# Patient Record
Sex: MALE | Race: ASIAN | NOT HISPANIC OR LATINO | URBAN - METROPOLITAN AREA
[De-identification: names, ages, dates, MRNs, and addresses within clinical notes are randomized per-mention and may not be internally consistent; named-entity substitution may affect disease eponyms.]

---

## 2017-06-08 ENCOUNTER — CHARTING TRANS (OUTPATIENT)
Dept: URGENT CARE | Age: 47
End: 2017-06-08

## 2017-06-08 ASSESSMENT — PAIN SCALES - GENERAL: PAINLEVEL_OUTOF10: 4

## 2017-06-15 ENCOUNTER — CHARTING TRANS (OUTPATIENT)
Dept: URGENT CARE | Age: 47
End: 2017-06-15

## 2017-06-15 ENCOUNTER — MYAURORA ACCOUNT LINK (OUTPATIENT)
Dept: OTHER | Age: 47
End: 2017-06-15

## 2017-09-13 ENCOUNTER — CHARTING TRANS (OUTPATIENT)
Dept: URGENT CARE | Age: 47
End: 2017-09-13

## 2017-10-18 ENCOUNTER — CHARTING TRANS (OUTPATIENT)
Dept: OTHER | Age: 47
End: 2017-10-18

## 2017-10-31 ENCOUNTER — CHARTING TRANS (OUTPATIENT)
Dept: URGENT CARE | Age: 47
End: 2017-10-31

## 2017-11-02 ENCOUNTER — CHARTING TRANS (OUTPATIENT)
Dept: URGENT CARE | Age: 47
End: 2017-11-02

## 2017-11-02 ENCOUNTER — MYAURORA ACCOUNT LINK (OUTPATIENT)
Dept: OTHER | Age: 47
End: 2017-11-02

## 2017-12-08 ENCOUNTER — CHARTING TRANS (OUTPATIENT)
Dept: OTHER | Age: 47
End: 2017-12-08

## 2017-12-08 ENCOUNTER — MYAURORA ACCOUNT LINK (OUTPATIENT)
Dept: OTHER | Age: 47
End: 2017-12-08

## 2017-12-08 ENCOUNTER — CHARTING TRANS (OUTPATIENT)
Dept: INTERNAL MEDICINE | Age: 47
End: 2017-12-08

## 2018-02-23 ENCOUNTER — CHARTING TRANS (OUTPATIENT)
Dept: OTHER | Age: 48
End: 2018-02-23

## 2018-02-23 ENCOUNTER — LAB SERVICES (OUTPATIENT)
Dept: OTHER | Age: 48
End: 2018-02-23

## 2018-02-23 LAB — DEPRECATED S PYO AG THROAT QL EIA: NEGATIVE

## 2018-04-29 ENCOUNTER — MYAURORA ACCOUNT LINK (OUTPATIENT)
Dept: OTHER | Age: 48
End: 2018-04-29

## 2018-04-29 ENCOUNTER — CHARTING TRANS (OUTPATIENT)
Dept: OTHER | Age: 48
End: 2018-04-29

## 2018-04-29 ASSESSMENT — PAIN SCALES - GENERAL: PAINLEVEL_OUTOF10: 5

## 2018-05-13 ENCOUNTER — MYAURORA ACCOUNT LINK (OUTPATIENT)
Dept: OTHER | Age: 48
End: 2018-05-13

## 2018-05-13 ENCOUNTER — CHARTING TRANS (OUTPATIENT)
Dept: OTHER | Age: 48
End: 2018-05-13

## 2018-05-13 ASSESSMENT — PAIN SCALES - GENERAL: PAINLEVEL_OUTOF10: 0

## 2018-05-15 ENCOUNTER — CHARTING TRANS (OUTPATIENT)
Dept: OTHER | Age: 48
End: 2018-05-15

## 2018-08-02 ENCOUNTER — CHARTING TRANS (OUTPATIENT)
Dept: OTHER | Age: 48
End: 2018-08-02

## 2018-08-02 ENCOUNTER — MYAURORA ACCOUNT LINK (OUTPATIENT)
Dept: OTHER | Age: 48
End: 2018-08-02

## 2018-10-12 ENCOUNTER — MYAURORA ACCOUNT LINK (OUTPATIENT)
Dept: OTHER | Age: 48
End: 2018-10-12

## 2018-10-12 ENCOUNTER — CHARTING TRANS (OUTPATIENT)
Dept: OTHER | Age: 48
End: 2018-10-12

## 2018-10-12 ASSESSMENT — PAIN SCALES - GENERAL: PAINLEVEL_OUTOF10: 0

## 2018-10-13 ENCOUNTER — MYAURORA ACCOUNT LINK (OUTPATIENT)
Dept: OTHER | Age: 48
End: 2018-10-13

## 2018-10-13 ENCOUNTER — CHARTING TRANS (OUTPATIENT)
Dept: OTHER | Age: 48
End: 2018-10-13

## 2018-10-13 ASSESSMENT — PAIN SCALES - GENERAL: PAINLEVEL_OUTOF10: 0

## 2018-11-27 VITALS — SYSTOLIC BLOOD PRESSURE: 108 MMHG | DIASTOLIC BLOOD PRESSURE: 56 MMHG | WEIGHT: 250 LBS | HEART RATE: 84 BPM

## 2018-11-27 VITALS
TEMPERATURE: 98.3 F | DIASTOLIC BLOOD PRESSURE: 82 MMHG | HEART RATE: 80 BPM | OXYGEN SATURATION: 97 % | RESPIRATION RATE: 16 BRPM | SYSTOLIC BLOOD PRESSURE: 142 MMHG

## 2018-11-27 VITALS
SYSTOLIC BLOOD PRESSURE: 136 MMHG | DIASTOLIC BLOOD PRESSURE: 86 MMHG | TEMPERATURE: 97.5 F | OXYGEN SATURATION: 96 % | HEART RATE: 86 BPM

## 2018-11-28 VITALS
SYSTOLIC BLOOD PRESSURE: 131 MMHG | OXYGEN SATURATION: 97 % | TEMPERATURE: 96.9 F | DIASTOLIC BLOOD PRESSURE: 55 MMHG | HEART RATE: 77 BPM | RESPIRATION RATE: 16 BRPM

## 2018-11-28 VITALS
OXYGEN SATURATION: 94 % | TEMPERATURE: 96.3 F | HEART RATE: 76 BPM | RESPIRATION RATE: 16 BRPM | SYSTOLIC BLOOD PRESSURE: 121 MMHG | DIASTOLIC BLOOD PRESSURE: 86 MMHG

## 2018-11-28 VITALS
HEART RATE: 70 BPM | OXYGEN SATURATION: 99 % | TEMPERATURE: 96.9 F | SYSTOLIC BLOOD PRESSURE: 138 MMHG | DIASTOLIC BLOOD PRESSURE: 86 MMHG | RESPIRATION RATE: 18 BRPM

## 2019-01-28 ENCOUNTER — WALK IN (OUTPATIENT)
Dept: URGENT CARE | Age: 49
End: 2019-01-28

## 2019-01-28 VITALS
OXYGEN SATURATION: 99 % | SYSTOLIC BLOOD PRESSURE: 132 MMHG | RESPIRATION RATE: 16 BRPM | HEART RATE: 79 BPM | DIASTOLIC BLOOD PRESSURE: 82 MMHG | TEMPERATURE: 97.4 F

## 2019-01-28 DIAGNOSIS — J06.9 URI, ACUTE: Primary | ICD-10-CM

## 2019-01-28 PROCEDURE — 99213 OFFICE O/P EST LOW 20 MIN: CPT | Performed by: FAMILY MEDICINE

## 2019-01-28 RX ORDER — LISINOPRIL 20 MG/1
20 TABLET ORAL
COMMUNITY
End: 2019-03-10 | Stop reason: ALTCHOICE

## 2019-01-28 RX ORDER — VALSARTAN 160 MG/1
1 TABLET ORAL
COMMUNITY
Start: 2016-02-22

## 2019-01-28 RX ORDER — DEXTROMETHORPHAN HYDROBROMIDE AND PROMETHAZINE HYDROCHLORIDE 15; 6.25 MG/5ML; MG/5ML
SYRUP ORAL
COMMUNITY
Start: 2018-10-12 | End: 2019-03-10 | Stop reason: ALTCHOICE

## 2019-01-28 RX ORDER — CETIRIZINE HYDROCHLORIDE 10 MG/1
10 TABLET ORAL
COMMUNITY
Start: 2015-05-15 | End: 2019-03-10 | Stop reason: CLARIF

## 2019-03-05 VITALS
RESPIRATION RATE: 20 BRPM | HEART RATE: 106 BPM | TEMPERATURE: 102.7 F | SYSTOLIC BLOOD PRESSURE: 160 MMHG | OXYGEN SATURATION: 95 % | DIASTOLIC BLOOD PRESSURE: 72 MMHG

## 2019-03-05 VITALS
OXYGEN SATURATION: 93 % | RESPIRATION RATE: 20 BRPM | SYSTOLIC BLOOD PRESSURE: 142 MMHG | HEART RATE: 112 BPM | DIASTOLIC BLOOD PRESSURE: 88 MMHG | TEMPERATURE: 102 F

## 2019-03-06 VITALS
DIASTOLIC BLOOD PRESSURE: 78 MMHG | RESPIRATION RATE: 16 BRPM | TEMPERATURE: 96.8 F | SYSTOLIC BLOOD PRESSURE: 124 MMHG | OXYGEN SATURATION: 98 % | HEART RATE: 66 BPM

## 2019-03-06 VITALS
HEART RATE: 60 BPM | TEMPERATURE: 97.4 F | DIASTOLIC BLOOD PRESSURE: 78 MMHG | OXYGEN SATURATION: 100 % | SYSTOLIC BLOOD PRESSURE: 122 MMHG | RESPIRATION RATE: 14 BRPM

## 2019-03-06 VITALS
OXYGEN SATURATION: 95 % | DIASTOLIC BLOOD PRESSURE: 82 MMHG | RESPIRATION RATE: 16 BRPM | TEMPERATURE: 98.1 F | SYSTOLIC BLOOD PRESSURE: 122 MMHG | HEART RATE: 110 BPM

## 2019-03-10 ENCOUNTER — WALK IN (OUTPATIENT)
Dept: URGENT CARE | Age: 49
End: 2019-03-10

## 2019-03-10 DIAGNOSIS — J06.9 URI, ACUTE: Primary | ICD-10-CM

## 2019-03-10 PROCEDURE — 99204 OFFICE O/P NEW MOD 45 MIN: CPT | Performed by: PEDIATRICS

## 2019-03-10 RX ORDER — ONDANSETRON 8 MG/1
8 TABLET, ORALLY DISINTEGRATING ORAL EVERY 8 HOURS PRN
Qty: 10 TABLET | Refills: 0 | Status: SHIPPED | OUTPATIENT
Start: 2019-03-10 | End: 2019-03-15

## 2019-03-10 RX ORDER — BENZONATATE 200 MG/1
200 CAPSULE ORAL 3 TIMES DAILY PRN
Qty: 30 CAPSULE | Refills: 0 | Status: SHIPPED | OUTPATIENT
Start: 2019-03-10 | End: 2019-03-20

## 2019-03-10 ASSESSMENT — PAIN SCALES - GENERAL: PAINLEVEL: 0

## 2019-05-14 ENCOUNTER — WALK IN (OUTPATIENT)
Dept: URGENT CARE | Age: 49
End: 2019-05-14

## 2019-05-14 VITALS
RESPIRATION RATE: 16 BRPM | OXYGEN SATURATION: 99 % | TEMPERATURE: 97.9 F | DIASTOLIC BLOOD PRESSURE: 90 MMHG | SYSTOLIC BLOOD PRESSURE: 140 MMHG | HEART RATE: 90 BPM

## 2019-05-14 DIAGNOSIS — J06.9 UPPER RESPIRATORY TRACT INFECTION, UNSPECIFIED TYPE: Primary | ICD-10-CM

## 2019-05-14 DIAGNOSIS — R05.9 COUGH: ICD-10-CM

## 2019-05-14 PROCEDURE — 99214 OFFICE O/P EST MOD 30 MIN: CPT | Performed by: FAMILY MEDICINE

## 2019-05-14 PROCEDURE — 3077F SYST BP >= 140 MM HG: CPT | Performed by: FAMILY MEDICINE

## 2019-05-14 RX ORDER — AZITHROMYCIN 250 MG/1
TABLET, FILM COATED ORAL
Qty: 6 TABLET | Refills: 0 | Status: SHIPPED | OUTPATIENT
Start: 2019-05-14 | End: 2019-05-19

## 2019-05-27 ENCOUNTER — WALK IN (OUTPATIENT)
Dept: URGENT CARE | Age: 49
End: 2019-05-27

## 2019-05-27 ENCOUNTER — IMAGING SERVICES (OUTPATIENT)
Dept: GENERAL RADIOLOGY | Age: 49
End: 2019-05-27
Attending: FAMILY MEDICINE

## 2019-05-27 DIAGNOSIS — S99.922A INJURY OF GREAT TOE OF LEFT FOOT, INITIAL ENCOUNTER: Primary | ICD-10-CM

## 2019-05-27 DIAGNOSIS — S99.922A INJURY OF GREAT TOE OF LEFT FOOT, INITIAL ENCOUNTER: ICD-10-CM

## 2019-05-27 PROCEDURE — 3074F SYST BP LT 130 MM HG: CPT | Performed by: FAMILY MEDICINE

## 2019-05-27 PROCEDURE — 99214 OFFICE O/P EST MOD 30 MIN: CPT | Performed by: FAMILY MEDICINE

## 2019-05-27 PROCEDURE — 3079F DIAST BP 80-89 MM HG: CPT | Performed by: FAMILY MEDICINE

## 2019-05-27 PROCEDURE — 73630 X-RAY EXAM OF FOOT: CPT | Performed by: RADIOLOGY

## 2019-05-27 ASSESSMENT — PAIN SCALES - GENERAL: PAINLEVEL: 5-6

## 2019-07-09 ENCOUNTER — HOSPITAL ENCOUNTER (OUTPATIENT)
Dept: MRI IMAGING | Facility: HOSPITAL | Age: 49
Discharge: HOME | End: 2019-07-09
Attending: ANESTHESIOLOGY
Payer: COMMERCIAL

## 2019-07-09 DIAGNOSIS — M25.572 LEFT ANKLE PAIN, UNSPECIFIED CHRONICITY: ICD-10-CM

## 2019-07-09 PROCEDURE — 73721 MRI JNT OF LWR EXTRE W/O DYE: CPT | Performed by: ANESTHESIOLOGY

## 2019-08-07 ENCOUNTER — WALK IN (OUTPATIENT)
Dept: URGENT CARE | Age: 49
End: 2019-08-07

## 2019-08-07 DIAGNOSIS — S90.112D CONTUSION OF LEFT GREAT TOE WITHOUT DAMAGE TO NAIL, SUBSEQUENT ENCOUNTER: Primary | ICD-10-CM

## 2019-08-07 PROCEDURE — 3077F SYST BP >= 140 MM HG: CPT | Performed by: FAMILY MEDICINE

## 2019-08-07 PROCEDURE — 3080F DIAST BP >= 90 MM HG: CPT | Performed by: FAMILY MEDICINE

## 2019-08-07 PROCEDURE — 99213 OFFICE O/P EST LOW 20 MIN: CPT | Performed by: FAMILY MEDICINE

## 2019-08-07 ASSESSMENT — PAIN SCALES - GENERAL: PAINLEVEL: 1-2

## 2019-08-31 ENCOUNTER — WALK IN (OUTPATIENT)
Dept: URGENT CARE | Age: 49
End: 2019-08-31

## 2019-08-31 DIAGNOSIS — R05.9 COUGH: Primary | ICD-10-CM

## 2019-08-31 PROCEDURE — 99214 OFFICE O/P EST MOD 30 MIN: CPT | Performed by: FAMILY MEDICINE

## 2019-08-31 PROCEDURE — 3079F DIAST BP 80-89 MM HG: CPT | Performed by: FAMILY MEDICINE

## 2019-08-31 PROCEDURE — 3077F SYST BP >= 140 MM HG: CPT | Performed by: FAMILY MEDICINE

## 2019-08-31 RX ORDER — AZITHROMYCIN 250 MG/1
TABLET, FILM COATED ORAL
Qty: 6 TABLET | Refills: 0 | Status: SHIPPED | OUTPATIENT
Start: 2019-08-31 | End: 2019-09-05

## 2019-08-31 RX ORDER — NAPROXEN 500 MG/1
500 TABLET ORAL 2 TIMES DAILY WITH MEALS
Qty: 30 TABLET | Refills: 0 | Status: SHIPPED | OUTPATIENT
Start: 2019-08-31 | End: 2020-03-12 | Stop reason: ALTCHOICE

## 2019-08-31 ASSESSMENT — PAIN SCALES - GENERAL: PAINLEVEL: 0

## 2019-10-24 ENCOUNTER — IMMUNIZATION (OUTPATIENT)
Dept: URGENT CARE | Age: 49
End: 2019-10-24

## 2019-10-24 DIAGNOSIS — Z23 INFLUENZA VACCINE ADMINISTERED: Primary | ICD-10-CM

## 2019-10-24 PROCEDURE — 90471 IMMUNIZATION ADMIN: CPT | Performed by: FAMILY MEDICINE

## 2019-10-24 PROCEDURE — 90686 IIV4 VACC NO PRSV 0.5 ML IM: CPT | Performed by: FAMILY MEDICINE

## 2019-11-26 ENCOUNTER — OFFICE VISIT (OUTPATIENT)
Dept: OTOLARYNGOLOGY | Age: 49
End: 2019-11-26

## 2019-11-26 VITALS — WEIGHT: 235 LBS

## 2019-11-26 DIAGNOSIS — H61.23 BILATERAL IMPACTED CERUMEN: Primary | ICD-10-CM

## 2019-11-26 PROCEDURE — 69210 REMOVE IMPACTED EAR WAX UNI: CPT | Performed by: PHYSICIAN ASSISTANT

## 2019-11-26 PROCEDURE — 99201 OFFICE/OUTPT VISIT,NEW,LEVL I: CPT | Performed by: PHYSICIAN ASSISTANT

## 2020-03-12 ENCOUNTER — WALK IN (OUTPATIENT)
Dept: URGENT CARE | Age: 50
End: 2020-03-12

## 2020-03-12 DIAGNOSIS — R04.0 EPISTAXIS: ICD-10-CM

## 2020-03-12 DIAGNOSIS — J06.9 ACUTE UPPER RESPIRATORY INFECTION, UNSPECIFIED: Primary | ICD-10-CM

## 2020-03-12 PROCEDURE — 99214 OFFICE O/P EST MOD 30 MIN: CPT | Performed by: FAMILY MEDICINE

## 2020-03-12 PROCEDURE — 3079F DIAST BP 80-89 MM HG: CPT | Performed by: FAMILY MEDICINE

## 2020-03-12 PROCEDURE — 3077F SYST BP >= 140 MM HG: CPT | Performed by: FAMILY MEDICINE

## 2020-03-12 ASSESSMENT — PAIN SCALES - GENERAL: PAINLEVEL: 0

## 2020-10-21 ENCOUNTER — APPOINTMENT (OUTPATIENT)
Dept: URGENT CARE | Age: 50
End: 2020-10-21

## 2020-10-21 DIAGNOSIS — Z23 NEED FOR IMMUNIZATION AGAINST INFLUENZA: Primary | ICD-10-CM

## 2020-10-21 PROCEDURE — 90471 IMMUNIZATION ADMIN: CPT

## 2020-10-21 PROCEDURE — 90686 IIV4 VACC NO PRSV 0.5 ML IM: CPT

## 2021-02-26 ENCOUNTER — OFFICE VISIT (OUTPATIENT)
Dept: OTOLARYNGOLOGY | Age: 51
End: 2021-02-26

## 2021-02-26 DIAGNOSIS — H61.23 BILATERAL IMPACTED CERUMEN: Primary | ICD-10-CM

## 2021-02-26 PROCEDURE — 69210 REMOVE IMPACTED EAR WAX UNI: CPT | Performed by: PHYSICIAN ASSISTANT

## 2021-05-25 VITALS
HEART RATE: 80 BPM | DIASTOLIC BLOOD PRESSURE: 88 MMHG | TEMPERATURE: 98.6 F | TEMPERATURE: 97.4 F | HEART RATE: 84 BPM | TEMPERATURE: 97.7 F | SYSTOLIC BLOOD PRESSURE: 150 MMHG | RESPIRATION RATE: 18 BRPM | OXYGEN SATURATION: 96 % | RESPIRATION RATE: 20 BRPM | OXYGEN SATURATION: 98 % | OXYGEN SATURATION: 97 % | DIASTOLIC BLOOD PRESSURE: 91 MMHG | HEART RATE: 75 BPM | DIASTOLIC BLOOD PRESSURE: 80 MMHG | SYSTOLIC BLOOD PRESSURE: 142 MMHG | SYSTOLIC BLOOD PRESSURE: 160 MMHG | RESPIRATION RATE: 16 BRPM | DIASTOLIC BLOOD PRESSURE: 80 MMHG | TEMPERATURE: 98.1 F | HEART RATE: 84 BPM | RESPIRATION RATE: 16 BRPM | OXYGEN SATURATION: 96 % | SYSTOLIC BLOOD PRESSURE: 152 MMHG | RESPIRATION RATE: 16 BRPM | HEART RATE: 76 BPM | SYSTOLIC BLOOD PRESSURE: 124 MMHG | DIASTOLIC BLOOD PRESSURE: 94 MMHG | TEMPERATURE: 100.2 F

## 2023-06-20 ENCOUNTER — HOSPITAL ENCOUNTER (EMERGENCY)
Facility: HOSPITAL | Age: 53
Discharge: HOME | End: 2023-06-20
Attending: EMERGENCY MEDICINE
Payer: COMMERCIAL

## 2023-06-20 VITALS
HEART RATE: 86 BPM | RESPIRATION RATE: 18 BRPM | WEIGHT: 219 LBS | SYSTOLIC BLOOD PRESSURE: 136 MMHG | OXYGEN SATURATION: 98 % | DIASTOLIC BLOOD PRESSURE: 95 MMHG | TEMPERATURE: 98 F

## 2023-06-20 DIAGNOSIS — R04.0 EPISTAXIS: Primary | ICD-10-CM

## 2023-06-20 PROCEDURE — 30901 CONTROL OF NOSEBLEED: CPT

## 2023-06-20 PROCEDURE — 99283 EMERGENCY DEPT VISIT LOW MDM: CPT

## 2023-06-20 RX ORDER — AMOXICILLIN 875 MG/1
875 TABLET, COATED ORAL 2 TIMES DAILY
Qty: 10 TABLET | Refills: 0 | Status: SHIPPED | OUTPATIENT
Start: 2023-06-20 | End: 2023-06-25

## 2023-06-20 RX ORDER — OXYMETAZOLINE HYDROCHLORIDE 0.05 G/100ML
1 SPRAY NASAL EVERY 12 HOURS PRN
Status: DISCONTINUED | OUTPATIENT
Start: 2023-06-20 | End: 2023-06-20

## 2023-06-20 NOTE — DISCHARGE INSTRUCTIONS
No bending, heavy lifting, stooping, or straining  Try not to rub, blow, or pick at nose  Sneeze with mouth open  Tylenol for pain  Amoxicillin antibiotic to prevent infection    Contact your ENT specialist today to arrange packing removal after 2 days

## 2023-06-20 NOTE — ED INITIAL ASSESSMENT (HPI)
A/O x 4. Patient presents with epistaxis primarily from right nare that began approx 30 mins ago. Patient states that this has occurred in the past due to his CPAP machine.   Denies any blood thinners